# Patient Record
Sex: MALE | Race: WHITE | NOT HISPANIC OR LATINO | Employment: FULL TIME | URBAN - METROPOLITAN AREA
[De-identification: names, ages, dates, MRNs, and addresses within clinical notes are randomized per-mention and may not be internally consistent; named-entity substitution may affect disease eponyms.]

---

## 2024-06-25 ENCOUNTER — OFFICE VISIT (OUTPATIENT)
Dept: URGENT CARE | Facility: CLINIC | Age: 39
End: 2024-06-25
Payer: COMMERCIAL

## 2024-06-25 ENCOUNTER — APPOINTMENT (OUTPATIENT)
Dept: RADIOLOGY | Facility: CLINIC | Age: 39
End: 2024-06-25
Payer: COMMERCIAL

## 2024-06-25 VITALS
BODY MASS INDEX: 39.65 KG/M2 | TEMPERATURE: 98.5 F | WEIGHT: 252.6 LBS | HEART RATE: 62 BPM | DIASTOLIC BLOOD PRESSURE: 76 MMHG | SYSTOLIC BLOOD PRESSURE: 134 MMHG | OXYGEN SATURATION: 99 % | HEIGHT: 67 IN | RESPIRATION RATE: 16 BRPM

## 2024-06-25 DIAGNOSIS — M25.511 ACUTE PAIN OF RIGHT SHOULDER: ICD-10-CM

## 2024-06-25 DIAGNOSIS — M54.10 RADICULOPATHY AFFECTING UPPER EXTREMITY: Primary | ICD-10-CM

## 2024-06-25 PROCEDURE — 73030 X-RAY EXAM OF SHOULDER: CPT

## 2024-06-25 PROCEDURE — G0382 LEV 3 HOSP TYPE B ED VISIT: HCPCS

## 2024-06-25 PROCEDURE — S9083 URGENT CARE CENTER GLOBAL: HCPCS

## 2024-06-25 RX ORDER — PREDNISONE 20 MG/1
60 TABLET ORAL DAILY
Qty: 15 TABLET | Refills: 0 | Status: SHIPPED | OUTPATIENT
Start: 2024-06-25 | End: 2024-06-30

## 2024-06-25 RX ORDER — TIZANIDINE 4 MG/1
4 TABLET ORAL EVERY 6 HOURS PRN
COMMUNITY
Start: 2024-03-15

## 2024-06-25 RX ORDER — ESCITALOPRAM OXALATE 20 MG/1
20 TABLET ORAL DAILY
COMMUNITY
Start: 2024-05-09

## 2024-06-25 RX ORDER — CLOTRIMAZOLE AND BETAMETHASONE DIPROPIONATE 10; .64 MG/G; MG/G
CREAM TOPICAL 2 TIMES DAILY
COMMUNITY
Start: 2024-06-19

## 2024-06-25 RX ORDER — LIDOCAINE 50 MG/G
1 PATCH TOPICAL DAILY
Qty: 7 PATCH | Refills: 0 | Status: SHIPPED | OUTPATIENT
Start: 2024-06-25 | End: 2024-07-02

## 2024-06-25 RX ORDER — NAPROXEN 500 MG/1
1 TABLET ORAL 2 TIMES DAILY WITH MEALS
COMMUNITY
Start: 2024-06-19

## 2024-06-25 RX ORDER — GABAPENTIN 100 MG/1
200 CAPSULE ORAL 3 TIMES DAILY
COMMUNITY
Start: 2024-02-08

## 2024-06-25 RX ORDER — LANOLIN ALCOHOL/MO/W.PET/CERES
100 CREAM (GRAM) TOPICAL DAILY
COMMUNITY

## 2024-06-25 RX ORDER — AMLODIPINE BESYLATE 5 MG/1
1 TABLET ORAL DAILY
COMMUNITY
Start: 2024-04-10

## 2024-06-25 RX ORDER — VALSARTAN 160 MG/1
TABLET ORAL
COMMUNITY
Start: 2024-04-10

## 2024-06-25 RX ORDER — DOXYCYCLINE 100 MG/1
100 CAPSULE ORAL 2 TIMES DAILY
COMMUNITY
Start: 2024-06-19

## 2024-06-25 RX ORDER — CHLORDIAZEPOXIDE HYDROCHLORIDE 5 MG/1
5 CAPSULE, GELATIN COATED ORAL 3 TIMES DAILY PRN
COMMUNITY
Start: 2024-05-09

## 2024-06-25 NOTE — PATIENT INSTRUCTIONS
"Xray initial interpretation: no acute osseous abnormality   Official radiology read pending - We will only notify you if there needs to be a change in your treatment plan. Please download the my chart duane so you can view your results.   Take prednisone as prescribed  Use Lidoderm patches as prescribed.     Rest (for no longer than 24 hours)  Stretching exercises  Alternate ice and heat  Follow up with Physical Therapy or Ortho if problems still persists.   Follow up with PCP in 3-5 days.  Proceed to  ER if symptoms worsen.    If tests are performed, our office will contact you with results only if changes need to made to the care plan discussed with you at the visit. You can review your full results on [facility].      Patient Education     Radiculopathy of the neck and back (including sciatica)   The Basics   Written by the doctors and editors at [facility]   What is radiculopathy? -- \"Radiculopathy\" is the medical term for the pain, weakness, numbness, or tingling that happens when nerves coming from the spinal cord get pinched or damaged. Radiculopathy can affect different parts of the body, depending on which nerve or group of nerves is affected. People sometimes refer to radiculopathy as having a \"pinched nerve.\"  Here are 2 common examples of radiculopathy:   Cervical radiculopathy - People with this type of radiculopathy have pain, weakness, numbness, or tingling down 1 or both arms. The condition happens when 1 or more of the nerves that go from the spine to the arm get pinched or damaged.   Lumbosacral radiculopathy - People with this type of radiculopathy have pain, weakness, numbness, or tingling in the buttocks or down the leg. The condition happens when 1 or more of the nerves that go from the spine to the foot and leg get pinched or damaged. People often call this \"sciatica.\"  What causes radiculopathy? -- Radiculopathy is usually caused by a problem with the back. To understand radiculopathy, " "it's helpful to first learn a little about the back and spine.  The neck and back are made up of (figure 1):   Vertebrae - These are the bones of the spine. Each has a hole in the center. The vertebrae are stacked to form a hollow tube called the \"spinal canal.\" The spinal cord passes through this tube and is protected by the vertebrae.   Spinal cord and nerves - The spinal cord is the bundle of nerves that connects the brain to the rest of the body. It runs through the vertebrae. Nerves branch from the spinal cord and pass in between the vertebrae. From there, they connect to the arms, the legs, and the organs.   Discs - Rubbery discs sit in between each of the vertebrae. These add cushion and allow movement.   Muscles, tendons, and ligaments - These support the vertebrae. They are used to move the head and neck, stand upright, and bend and flex the body. They are also called the \"soft tissues\" of the neck and back.  Radiculopathy can happen when changes in the back cause a nerve to get pinched or damaged. This can happen if:   The vertebrae form bumps called bone spurs, which press on nearby nerves. People with a condition called \"spinal stenosis\" often have this problem (figure 2).   The discs between the vertebrae break open and bulge out, causing them to press on or irritate nearby nerves. (A disc that breaks open and bulges is called a \"herniated disc.\")   Other medical conditions, such as diabetes, infection, inflammation, or a tumor, injure the nerves near the spinal cord.  Should I see a doctor or nurse? -- If you have new pain, weakness, numbness, or tingling that seems to spread out to your arms or legs from your spine, see your doctor or nurse.  Will I need tests? -- Maybe. Doctors can tell a lot about a person's radiculopathy based on which parts of the body are affected and how. Because of that, you might not need any tests, especially if you have had symptoms only for a short time. Still, if your " "doctor is concerned about nerve damage, they might order 1 or more of these tests:   Imaging tests - Imaging tests, such as X-rays, MRIs, or CT scans, create pictures of the inside of the body. These imaging tests can show problems with the back, like those described above.   Electromyography (also called \"EMG\" or \"nerve conduction study\") - During this test, a technician or doctor checks how well electrical pulses travel across nerves to the part of your body that has symptoms. The test helps show whether the nerves controlling that body part are working right.  How is radiculopathy treated? -- Many people with radiculopathy do not need formal treatment. In some cases, the radiculopathy goes away as the back and nerves heal. In other cases, people find ways to cope with their symptoms.  When people do get treatment, the treatments can include:   Pain medicines that you can get without a prescription (if these do not work, your doctor might prescribe a stronger pain medicine)   Medicines to relax the muscles (called muscle relaxants)   Avoiding activities or positions that make the pain worse   Injections of medicines that numb the back or reduce swelling   Physical therapy to learn special exercises and stretches   Surgery to repair the problem causing symptoms  All topics are updated as new evidence becomes available and our peer review process is complete.  This topic retrieved from [employer] on: May 10, 2024.  Topic 78490 Version 12.0  Release: 32.4.3 - C32.129  © 2024 UpToDate, Inc. and/or its affiliates. All rights reserved.  figure 1: Anatomy of the back     This drawing shows the different parts of the back. Back pain can be caused by problems with the muscles, ligaments, discs, bones (vertebrae), or nerves.  Graphic 64726 Version 6.0  figure 2: Lumbar spinal stenosis     Spinal stenosis is when the spinal canal (the space around the spinal cord) becomes too narrow. When is affects the lower back, it is " "called \"lumbar\" spinal stenosis. It can be caused by different things, including bone spurs. If the spinal cord or nerves are pinched, this can lead to symptoms like pain, tingling, numbness, and weakness.  Graphic 227811 Version 3.0  Consumer Information Use and Disclaimer   Disclaimer: This generalized information is a limited summary of diagnosis, treatment, and/or medication information. It is not meant to be comprehensive and should be used as a tool to help the user understand and/or assess potential diagnostic and treatment options. It does NOT include all information about conditions, treatments, medications, side effects, or risks that may apply to a specific patient. It is not intended to be medical advice or a substitute for the medical advice, diagnosis, or treatment of a health care provider based on the health care provider's examination and assessment of a patient's specific and unique circumstances. Patients must speak with a health care provider for complete information about their health, medical questions, and treatment options, including any risks or benefits regarding use of medications. This information does not endorse any treatments or medications as safe, effective, or approved for treating a specific patient. UpToDate, Inc. and its affiliates disclaim any warranty or liability relating to this information or the use thereof.The use of this information is governed by the Terms of Use, available at https://www.[employer].Mediau[employer].com/en/know/clinical-effectiveness-terms. 2024© UpToDate, Inc. and its affiliates and/or licensors. All rights reserved.  Copyright   © 2024 UpToDate, Inc. and/or its affiliates. All rights reserved.    "

## 2024-06-25 NOTE — PROGRESS NOTES
[facility]        NAME: Ruby Frausto is a 38 y.o. male  : 1985    MRN: [id number]  DATE: 2024  TIME: 3:28 PM    Assessment and Plan   Radiculopathy affecting upper extremity [M54.10]  1. Radiculopathy affecting upper extremity  XR shoulder 2+ vw right    predniSONE 20 mg tablet    lidocaine (Lidoderm) 5 %    Ambulatory Referral to Physical Therapy    Ambulatory Referral to Orthopedic Surgery        Xray initial interpretation: no acute osseous abnormality   Official radiology read pending - We will only notify you if there needs to be a change in your treatment plan.  Will treat with prednisone, Lidoderm patches, and continued use of home medications of NSAID and muscle relaxer - supportive care reviewed  PT and ortho referral placed  Follow up with PCP    Patient Instructions     Xray initial interpretation: no acute osseous abnormality   Official radiology read pending - We will only notify you if there needs to be a change in your treatment plan. Please download the my chart [facility]ane so you can view your results.     Take prednisone as prescribed  Use Lidoderm patches as prescribed.     Rest (for no longer than 24 hours)  Stretching exercises  Alternate ice and heat  Follow up with Physical Therapy or Ortho if problems still persists.     Follow up with PCP in 3-5 days.  Proceed to  ER if symptoms worsen.    If tests are performed, our office will contact you with results only if changes need to made to the care plan discussed with you at the visit. [facility]    Chief Complaint     Chief Complaint   Patient presents with    Shoulder Pain     Right shoulder pain that radiates to elbow and causes tingling in fingers starting  morning. Worse in the am, eases throughout the day. Taking naproxen and muscle relaxer but not helping. Hx of dislocation 8-10 years ago, no recent injuries.          History of Present Illness       38-year-old male  arrives reporting right shoulder pain that radiates down right arm that is sharp and causes a tingling sensation in fingertips.  Patient reports he recently did workout his shoulders and back over the weekend and is unsure if this is related.  Patient reports a history of chronic back pain and is currently taking naproxen and a muscle relaxer for his back pain and thought that this would help his shoulder pain but it has not.  Patient is concerned there is something wrong in the bones, because he has a history of dislocation a few years ago and feels the pain is deep.  Will get x-ray of right shoulder and review.  Patient denies any new injury to right shoulder.  Patient has positive pulse motor sensation intact in affected extremity.  Patient has full range of motion in right shoulder, however has pain with movement.    Shoulder Pain   Pertinent negatives include no fever.       Review of Systems   Review of Systems   Constitutional:  Negative for chills and fever.   Respiratory:  Negative for cough and shortness of breath.    Cardiovascular:  Negative for chest pain.   Musculoskeletal:  Positive for myalgias (right shoulder pain that radiates down right arm and causes a sharp tingling sensation). Negative for back pain, neck pain and neck stiffness.   Skin:  Negative for color change.         Current Medications       Current Outpatient Medications:     amLODIPine (NORVASC) 5 mg tablet, Take 1 tablet by mouth daily, Disp: , Rfl:     chlordiazePOXIDE (LIBRIUM) 5 mg capsule, Take 5 mg by mouth Three times daily as needed, Disp: , Rfl:     clotrimazole-betamethasone (LOTRISONE) 1-0.05 % cream, Apply topically 2 (two) times a day, Disp: , Rfl:     doxycycline monohydrate (MONODOX) 100 mg capsule, Take 100 mg by mouth 2 (two) times a day, Disp: , Rfl:     escitalopram (LEXAPRO) 20 mg tablet, Take 20 mg by mouth daily, Disp: , Rfl:     gabapentin (NEURONTIN) 100 mg capsule, Take 200 mg by mouth Three times a day, Disp:  ", Rfl:     lidocaine (Lidoderm) 5 %, Apply 1 patch topically over 12 hours daily for 7 days Remove & Discard patch within 12 hours or as directed by MD, Disp: 7 patch, Rfl: 0    MILK THISTLE PO, Take by mouth, Disp: , Rfl:     Multiple Vitamin (MULTIVITAMIN PO), Take 1 capsule by mouth daily, Disp: , Rfl:     naproxen (NAPROSYN) 500 mg tablet, Take 1 tablet by mouth 2 (two) times a day with meals, Disp: , Rfl:     predniSONE 20 mg tablet, Take 3 tablets (60 mg total) by mouth daily for 5 days, Disp: 15 tablet, Rfl: 0    thiamine 100 MG tablet, Take 100 mg by mouth daily, Disp: , Rfl:     tiZANidine (ZANAFLEX) 4 mg tablet, Take 4 mg by mouth every 6 (six) hours as needed, Disp: , Rfl:     valsartan (DIOVAN) 160 mg tablet, , Disp: , Rfl:     Current Allergies     Allergies as of 06/25/2024 - Reviewed 06/25/2024   Allergen Reaction Noted    Pollen extract Other (See Comments) 03/26/2015            The following portions of the patient's history were reviewed and updated as appropriate: allergies, current medications, past family history, past medical history, past social history, past surgical history and problem list.     Past Medical History:   Diagnosis Date    Anxiety     High cholesterol     Hypertension        History reviewed. No pertinent surgical history.    Family History   Problem Relation Age of Onset    No Known Problems Mother     No Known Problems Father          Medications have been verified.        Objective   /76   Pulse 62   Temp 98.5 °F (36.9 °C)   Resp 16   Ht 5' 7\" (1.702 m)   Wt 115 kg (252 lb 9.6 oz)   SpO2 99%   BMI 39.56 kg/m²        Physical Exam     Physical Exam  Vitals and nursing note reviewed.   Constitutional:       General: He is not in acute distress.     Appearance: Normal appearance. He is not ill-appearing.   HENT:      Head: Normocephalic.      Right Ear: External ear normal.      Left Ear: External ear normal.      Nose: Nose normal. No congestion.      " Mouth/Throat:      Mouth: Mucous membranes are moist.      Pharynx: No oropharyngeal exudate or posterior oropharyngeal erythema.   Eyes:      Extraocular Movements: Extraocular movements intact.      Conjunctiva/sclera: Conjunctivae normal.      Pupils: Pupils are equal, round, and reactive to light.   Cardiovascular:      Rate and Rhythm: Normal rate and regular rhythm.      Pulses: Normal pulses.      Heart sounds: Normal heart sounds.   Pulmonary:      Effort: Pulmonary effort is normal. No respiratory distress.      Breath sounds: Normal breath sounds. No stridor. No wheezing, rhonchi or rales.   Chest:      Chest wall: No tenderness.   Musculoskeletal:         General: Tenderness present.      Right shoulder: Tenderness present. No swelling, deformity, laceration, bony tenderness or crepitus. Decreased range of motion. Normal strength. Normal pulse.      Cervical back: Normal range of motion and neck supple.   Lymphadenopathy:      Cervical: No cervical adenopathy.   Skin:     General: Skin is warm and dry.   Neurological:      General: No focal deficit present.      Mental Status: He is alert and oriented to person, place, and time.   Psychiatric:         Mood and Affect: Mood normal.         Behavior: Behavior normal.

## 2024-12-17 ENCOUNTER — OFFICE VISIT (OUTPATIENT)
Dept: URGENT CARE | Facility: CLINIC | Age: 39
End: 2024-12-17
Payer: COMMERCIAL

## 2024-12-17 VITALS
SYSTOLIC BLOOD PRESSURE: 178 MMHG | TEMPERATURE: 98.3 F | BODY MASS INDEX: 40.34 KG/M2 | WEIGHT: 257 LBS | DIASTOLIC BLOOD PRESSURE: 87 MMHG | HEIGHT: 67 IN | RESPIRATION RATE: 18 BRPM | OXYGEN SATURATION: 98 % | HEART RATE: 86 BPM

## 2024-12-17 DIAGNOSIS — T78.40XA ALLERGIC REACTION, INITIAL ENCOUNTER: Primary | ICD-10-CM

## 2024-12-17 PROBLEM — Z87.898 HISTORY OF ALCOHOL USE DISORDER: Status: ACTIVE | Noted: 2019-11-07

## 2024-12-17 PROBLEM — R74.01 ELEVATION OF LEVELS OF LIVER TRANSAMINASE LEVELS: Status: ACTIVE | Noted: 2019-11-07

## 2024-12-17 PROBLEM — E80.6 HYPERBILIRUBINEMIA: Status: ACTIVE | Noted: 2023-10-15

## 2024-12-17 PROBLEM — G47.09 OTHER INSOMNIA: Status: ACTIVE | Noted: 2023-10-20

## 2024-12-17 PROBLEM — E87.29 HIGH ANION GAP METABOLIC ACIDOSIS: Status: ACTIVE | Noted: 2023-10-15

## 2024-12-17 PROBLEM — M54.50 CHRONIC BILATERAL LOW BACK PAIN WITHOUT SCIATICA: Status: ACTIVE | Noted: 2023-06-14

## 2024-12-17 PROBLEM — E66.09 CLASS 2 OBESITY DUE TO EXCESS CALORIES WITH BODY MASS INDEX (BMI) OF 38.0 TO 38.9 IN ADULT: Status: ACTIVE | Noted: 2023-10-15

## 2024-12-17 PROBLEM — G89.29 CHRONIC BILATERAL LOW BACK PAIN WITHOUT SCIATICA: Status: ACTIVE | Noted: 2023-06-14

## 2024-12-17 PROBLEM — E55.9 VITAMIN D DEFICIENCY: Status: ACTIVE | Noted: 2019-08-01

## 2024-12-17 PROBLEM — E78.00 PURE HYPERCHOLESTEROLEMIA: Status: ACTIVE | Noted: 2022-11-30

## 2024-12-17 PROBLEM — E87.1 HYPONATREMIA: Status: ACTIVE | Noted: 2023-10-15

## 2024-12-17 PROBLEM — E66.812 CLASS 2 OBESITY DUE TO EXCESS CALORIES WITH BODY MASS INDEX (BMI) OF 38.0 TO 38.9 IN ADULT: Status: ACTIVE | Noted: 2023-10-15

## 2024-12-17 PROBLEM — R60.0 BILATERAL LOWER EXTREMITY EDEMA: Status: ACTIVE | Noted: 2020-10-28

## 2024-12-17 PROBLEM — E87.6 HYPOKALEMIA: Status: ACTIVE | Noted: 2023-10-15

## 2024-12-17 PROBLEM — E78.5 HYPERLIPIDEMIA: Status: ACTIVE | Noted: 2023-10-14

## 2024-12-17 PROBLEM — R00.0 SINUS TACHYCARDIA: Status: ACTIVE | Noted: 2023-10-15

## 2024-12-17 PROBLEM — E83.42 HYPOMAGNESEMIA: Status: ACTIVE | Noted: 2023-10-15

## 2024-12-17 PROBLEM — K21.9 GASTROESOPHAGEAL REFLUX DISEASE WITHOUT ESOPHAGITIS: Status: ACTIVE | Noted: 2019-08-01

## 2024-12-17 PROBLEM — F41.1 GENERALIZED ANXIETY DISORDER: Status: ACTIVE | Noted: 2020-08-18

## 2024-12-17 PROBLEM — I10 HYPERTENSION: Status: ACTIVE | Noted: 2023-10-14

## 2024-12-17 PROBLEM — F10.939 ALCOHOL WITHDRAWAL SYNDROME (HCC): Status: ACTIVE | Noted: 2023-10-14

## 2024-12-17 PROCEDURE — S9083 URGENT CARE CENTER GLOBAL: HCPCS

## 2024-12-17 PROCEDURE — G0382 LEV 3 HOSP TYPE B ED VISIT: HCPCS

## 2024-12-17 RX ORDER — PREDNISONE 10 MG/1
TABLET ORAL
Qty: 42 TABLET | Refills: 0 | Status: SHIPPED | OUTPATIENT
Start: 2024-12-17

## 2024-12-17 RX ORDER — TRIAMCINOLONE ACETONIDE 1 MG/G
CREAM TOPICAL 2 TIMES DAILY
Qty: 80 G | Refills: 1 | Status: SHIPPED | OUTPATIENT
Start: 2024-12-17

## 2024-12-17 NOTE — PROGRESS NOTES
[facility]        NAME: Ruby Frausto is a 39 y.o. male  : 1985    MRN: [id number]  DATE: 2024  TIME: 5:12 PM    Assessment and Plan   Allergic reaction, initial encounter [T78.40XA]  1. Allergic reaction, initial encounter  predniSONE 10 mg tablet    triamcinolone (KENALOG) 0.1 % cream            Patient Instructions     Take prednisone as prescribed - follow taper instructions - take in the morning with food  Use kenalog cream as prescribed.     Oatmeal baths  Avoid scratching area  Topical benadryl cream or calamine lotion during the day  Cool compresses  Allegra and Pepcid over the counter for daytime itchiness  Oral benadryl for itching as needed at night  Keep area clean and dry  Watch for signs of infection    Can discuss allergy testing with PCP to find out cause    Follow up with PCP in 3-5 days.  Proceed to  ER if symptoms worsen.    If tests are performed, our office will contact you with results only if changes need to made to the care plan discussed with you at the visit. [facility]    Chief Complaint     Chief Complaint   Patient presents with   • Urticaria     Unknown as to cause  Started on friday after visiting dentist  Has been getting worse since; itching          History of Present Illness       39-year-old male arrives reporting a rash that has spread over his trunk for the past 5 days.  Patient reports he noticed the rash after having x-rays completed at the dentist office.  Patient reports initially the rash was very small and only slightly itchy.  Patient reports over the past 2 to 3 days the rash has significantly spread onto bilateral arms, neck, face, chest and back and is severely itchy.  Patient denies any throat closing sensation, tightness in chest, shortness of breath, chest pain or abdominal pain at present.  Patient denies any fevers.  Patient denies any new medications, soaps, detergents or lotions.   Patient denies any new foods.  Patient denies any exposure to poison.    Urticaria      Review of Systems   Review of Systems   Constitutional: Negative.    HENT: Negative.     Respiratory: Negative.     Cardiovascular: Negative.    Gastrointestinal: Negative.    Musculoskeletal: Negative.    Skin:  Positive for rash.         Current Medications       Current Outpatient Medications:   •  amLODIPine (NORVASC) 5 mg tablet, Take 1 tablet by mouth daily, Disp: , Rfl:   •  chlordiazePOXIDE (LIBRIUM) 5 mg capsule, Take 5 mg by mouth Three times daily as needed, Disp: , Rfl:   •  escitalopram (LEXAPRO) 20 mg tablet, Take 20 mg by mouth daily, Disp: , Rfl:   •  gabapentin (NEURONTIN) 100 mg capsule, Take 200 mg by mouth Three times a day, Disp: , Rfl:   •  lidocaine (Lidoderm) 5 %, Apply 1 patch topically over 12 hours daily for 7 days Remove & Discard patch within 12 hours or as directed by MD, Disp: 7 patch, Rfl: 0  •  MILK THISTLE PO, Take by mouth, Disp: , Rfl:   •  Multiple Vitamin (MULTIVITAMIN PO), Take 1 capsule by mouth daily, Disp: , Rfl:   •  naproxen (NAPROSYN) 500 mg tablet, Take 1 tablet by mouth 2 (two) times a day with meals, Disp: , Rfl:   •  predniSONE 10 mg tablet, Take 60 mg on day one and two, Take 50 mg on day 3 - 4, Take 40 mg on day 5-6, Take 30 mg on day 7-8, Take 20 mg on day 8-9, Take 10 mg on day 10-11, Disp: 42 tablet, Rfl: 0  •  tiZANidine (ZANAFLEX) 4 mg tablet, Take 4 mg by mouth every 6 (six) hours as needed, Disp: , Rfl:   •  triamcinolone (KENALOG) 0.1 % cream, Apply topically 2 (two) times a day, Disp: 80 g, Rfl: 1  •  valsartan (DIOVAN) 160 mg tablet, , Disp: , Rfl:   •  clotrimazole-betamethasone (LOTRISONE) 1-0.05 % cream, Apply topically 2 (two) times a day (Patient not taking: Reported on 12/17/2024), Disp: , Rfl:   •  doxycycline monohydrate (MONODOX) 100 mg capsule, Take 100 mg by mouth 2 (two) times a day (Patient not taking: Reported on 12/17/2024), Disp: , Rfl:   •  thiamine 100  "MG tablet, Take 100 mg by mouth daily (Patient not taking: Reported on 12/17/2024), Disp: , Rfl:     Current Allergies     Allergies as of 12/17/2024 - Reviewed 12/17/2024   Allergen Reaction Noted   • Pollen extract Other (See Comments) 03/26/2015            The following portions of the patient's history were reviewed and updated as appropriate: allergies, current medications, past family history, past medical history, past social history, past surgical history and problem list.     Past Medical History:   Diagnosis Date   • Anxiety    • High cholesterol    • Hypertension        History reviewed. No pertinent surgical history.    Family History   Problem Relation Age of Onset   • No Known Problems Mother    • No Known Problems Father          Medications have been verified.        Objective   BP (!) 178/87   Pulse 86   Temp 98.3 °F (36.8 °C)   Resp 18   Ht 5' 7\" (1.702 m)   Wt 117 kg (257 lb)   SpO2 98%   BMI 40.25 kg/m²        Physical Exam     Physical Exam  Vitals and nursing note reviewed.   Constitutional:       Appearance: Normal appearance. He is not toxic-appearing or diaphoretic.   HENT:      Head: Normocephalic.      Right Ear: Tympanic membrane, ear canal and external ear normal.      Left Ear: Tympanic membrane, ear canal and external ear normal.      Nose: Nose normal.      Mouth/Throat:      Mouth: Mucous membranes are moist.      Pharynx: No posterior oropharyngeal erythema.   Eyes:      Extraocular Movements: Extraocular movements intact.      Pupils: Pupils are equal, round, and reactive to light.   Cardiovascular:      Rate and Rhythm: Normal rate.      Pulses: Normal pulses.      Heart sounds: Normal heart sounds.   Pulmonary:      Effort: Pulmonary effort is normal. No respiratory distress.      Breath sounds: Normal breath sounds. No stridor. No wheezing, rhonchi or rales.   Chest:      Chest wall: No tenderness.   Musculoskeletal:         General: Normal range of motion.      Cervical " back: Normal range of motion.   Lymphadenopathy:      Cervical: No cervical adenopathy.   Skin:     General: Skin is warm and dry.      Capillary Refill: Capillary refill takes less than 2 seconds.      Findings: Rash present. Rash is papular and urticarial.      Comments: diffuse   Neurological:      General: No focal deficit present.      Mental Status: He is alert and oriented to person, place, and time.   Psychiatric:         Mood and Affect: Mood normal.

## 2024-12-17 NOTE — PATIENT INSTRUCTIONS
Take prednisone as prescribed - follow taper instructions - take in the morning with food  Use kenalog cream as prescribed.     Oatmeal baths  Avoid scratching area  Topical benadryl cream or calamine lotion during the day  Cool compresses  Allegra and Pepcid over the counter for daytime itchiness  Oral benadryl for itching as needed at night  Keep area clean and dry  Watch for signs of infection    Can discuss allergy testing with PCP to find out cause    Follow up with PCP in 3-5 days.  Proceed to  ER if symptoms worsen.    If tests are performed, our office will contact you with results only if changes need to made to the care plan discussed with you at the visit. You can review your full results on [facility].

## 2025-01-21 ENCOUNTER — OFFICE VISIT (OUTPATIENT)
Dept: URGENT CARE | Facility: CLINIC | Age: 40
End: 2025-01-21
Payer: COMMERCIAL

## 2025-01-21 VITALS
HEIGHT: 66 IN | BODY MASS INDEX: 41.21 KG/M2 | TEMPERATURE: 97.5 F | RESPIRATION RATE: 18 BRPM | OXYGEN SATURATION: 99 % | WEIGHT: 256.4 LBS | HEART RATE: 102 BPM | SYSTOLIC BLOOD PRESSURE: 159 MMHG | DIASTOLIC BLOOD PRESSURE: 101 MMHG

## 2025-01-21 DIAGNOSIS — K08.89 TOOTHACHE: Primary | ICD-10-CM

## 2025-01-21 PROCEDURE — G0382 LEV 3 HOSP TYPE B ED VISIT: HCPCS | Performed by: PHYSICIAN ASSISTANT

## 2025-01-21 PROCEDURE — S9083 URGENT CARE CENTER GLOBAL: HCPCS | Performed by: PHYSICIAN ASSISTANT

## 2025-01-21 RX ORDER — PANTOPRAZOLE SODIUM 40 MG/1
40 TABLET, DELAYED RELEASE ORAL DAILY
COMMUNITY
Start: 2024-12-30

## 2025-01-21 RX ORDER — ATORVASTATIN CALCIUM 40 MG/1
40 TABLET, FILM COATED ORAL DAILY
COMMUNITY
Start: 2025-01-10

## 2025-01-22 NOTE — PROGRESS NOTES
"[facility]        NAME: Ruby Frausto is a 39 y.o. male  : 1985    MRN: [id number]  DATE: 2025  TIME: 7:22 PM    BP (!) 159/101   Pulse 102   Temp 97.5 °F (36.4 °C)   Resp 18   Ht 5' 6\" (1.676 m)   Wt 116 kg (256 lb 6.4 oz)   SpO2 99%   BMI 41.38 kg/m²     Assessment and Plan   Toothache [K08.89]  1. Toothache  amoxicillin-clavulanate (AUGMENTIN) 875-125 mg per tablet            Patient Instructions       Follow up with PCP in 3-5 days.  Proceed to  ER if symptoms worsen.    Chief Complaint     Chief Complaint   Patient presents with    Dental Pain     Left lower jaw pain from crack tooth going on for about a month. Dentist appointment next Thursday. Taking naproxen but having no relief.         History of Present Illness       Pt with left lower dental pain has seen dentist has appt for dental extraction     Dental Pain         Review of Systems   Review of Systems   Constitutional: Negative.    HENT: Negative.     Eyes: Negative.    Respiratory: Negative.     Cardiovascular: Negative.    Gastrointestinal: Negative.    Endocrine: Negative.    Genitourinary: Negative.    Musculoskeletal: Negative.    Skin: Negative.    Allergic/Immunologic: Negative.    Neurological: Negative.    Hematological: Negative.    Psychiatric/Behavioral: Negative.     All other systems reviewed and are negative.        Current Medications       Current Outpatient Medications:     amoxicillin-clavulanate (AUGMENTIN) 875-125 mg per tablet, Take 1 tablet by mouth every 12 (twelve) hours for 10 days, Disp: 20 tablet, Rfl: 0    atorvastatin (LIPITOR) 40 mg tablet, Take 40 mg by mouth daily, Disp: , Rfl:     Omega-3 Fatty Acids (FISH OIL PO), Take by mouth, Disp: , Rfl:     pantoprazole (PROTONIX) 40 mg tablet, Take 40 mg by mouth daily, Disp: , Rfl:     amLODIPine (NORVASC) 5 mg tablet, Take 1 tablet by mouth daily, Disp: , Rfl:     chlordiazePOXIDE (LIBRIUM) 5 mg capsule, Take 5 mg by mouth Three times " daily as needed, Disp: , Rfl:     clotrimazole-betamethasone (LOTRISONE) 1-0.05 % cream, Apply topically 2 (two) times a day (Patient not taking: Reported on 1/21/2025), Disp: , Rfl:     doxycycline monohydrate (MONODOX) 100 mg capsule, Take 100 mg by mouth 2 (two) times a day (Patient not taking: Reported on 1/21/2025), Disp: , Rfl:     escitalopram (LEXAPRO) 20 mg tablet, Take 20 mg by mouth daily, Disp: , Rfl:     gabapentin (NEURONTIN) 100 mg capsule, Take 200 mg by mouth Three times a day, Disp: , Rfl:     lidocaine (Lidoderm) 5 %, Apply 1 patch topically over 12 hours daily for 7 days Remove & Discard patch within 12 hours or as directed by MD, Disp: 7 patch, Rfl: 0    MILK THISTLE PO, Take by mouth, Disp: , Rfl:     Multiple Vitamin (MULTIVITAMIN PO), Take 1 capsule by mouth daily, Disp: , Rfl:     naproxen (NAPROSYN) 500 mg tablet, Take 1 tablet by mouth 2 (two) times a day with meals, Disp: , Rfl:     predniSONE 10 mg tablet, Take 60 mg on day one and two, Take 50 mg on day 3 - 4, Take 40 mg on day 5-6, Take 30 mg on day 7-8, Take 20 mg on day 8-9, Take 10 mg on day 10-11 (Patient not taking: Reported on 1/21/2025), Disp: 42 tablet, Rfl: 0    thiamine 100 MG tablet, Take 100 mg by mouth daily, Disp: , Rfl:     tiZANidine (ZANAFLEX) 4 mg tablet, Take 4 mg by mouth every 6 (six) hours as needed, Disp: , Rfl:     triamcinolone (KENALOG) 0.1 % cream, Apply topically 2 (two) times a day (Patient not taking: Reported on 1/21/2025), Disp: 80 g, Rfl: 1    valsartan (DIOVAN) 160 mg tablet, , Disp: , Rfl:     Current Allergies     Allergies as of 01/21/2025 - Reviewed 01/21/2025   Allergen Reaction Noted    Pollen extract Other (See Comments) 03/26/2015            The following portions of the patient's history were reviewed and updated as appropriate: allergies, current medications, past family history, past medical history, past social history, past surgical history and problem list.     Past Medical History:  "  Diagnosis Date    Anxiety     High cholesterol     Hypertension        History reviewed. No pertinent surgical history.    Family History   Problem Relation Age of Onset    No Known Problems Mother     No Known Problems Father          Medications have been verified.        Objective   BP (!) 159/101   Pulse 102   Temp 97.5 °F (36.4 °C)   Resp 18   Ht 5' 6\" (1.676 m)   Wt 116 kg (256 lb 6.4 oz)   SpO2 99%   BMI 41.38 kg/m²        Physical Exam     Physical Exam  Vitals and nursing note reviewed.   Constitutional:       Appearance: Normal appearance. He is normal weight.   HENT:      Head: Normocephalic and atraumatic.      Right Ear: Tympanic membrane, ear canal and external ear normal.      Left Ear: Tympanic membrane, ear canal and external ear normal.      Nose: Nose normal.      Mouth/Throat:      Mouth: Mucous membranes are moist.      Pharynx: Oropharynx is clear.      Comments: Left lower molar tenderness no swelling no erythema  + jaw pain no neck pain no ear pain   Eyes:      Extraocular Movements: Extraocular movements intact.      Conjunctiva/sclera: Conjunctivae normal.      Pupils: Pupils are equal, round, and reactive to light.   Cardiovascular:      Rate and Rhythm: Normal rate and regular rhythm.      Pulses: Normal pulses.      Heart sounds: Normal heart sounds.   Pulmonary:      Effort: Pulmonary effort is normal.      Breath sounds: Normal breath sounds.   Abdominal:      Palpations: Abdomen is soft.   Musculoskeletal:         General: Normal range of motion.      Cervical back: Normal range of motion and neck supple.   Skin:     General: Skin is warm.      Capillary Refill: Capillary refill takes less than 2 seconds.   Neurological:      Mental Status: He is alert and oriented to person, place, and time.                     "

## 2025-06-13 ENCOUNTER — OFFICE VISIT (OUTPATIENT)
Dept: URGENT CARE | Facility: CLINIC | Age: 40
End: 2025-06-13
Payer: COMMERCIAL

## 2025-06-13 VITALS
RESPIRATION RATE: 18 BRPM | SYSTOLIC BLOOD PRESSURE: 125 MMHG | TEMPERATURE: 98.5 F | OXYGEN SATURATION: 97 % | HEIGHT: 66 IN | WEIGHT: 239.6 LBS | BODY MASS INDEX: 38.51 KG/M2 | DIASTOLIC BLOOD PRESSURE: 73 MMHG | HEART RATE: 65 BPM

## 2025-06-13 DIAGNOSIS — L50.9 URTICARIA: Primary | ICD-10-CM

## 2025-06-13 PROCEDURE — S9083 URGENT CARE CENTER GLOBAL: HCPCS

## 2025-06-13 PROCEDURE — G0382 LEV 3 HOSP TYPE B ED VISIT: HCPCS

## 2025-06-13 RX ORDER — HYDROXYZINE PAMOATE 25 MG/1
CAPSULE ORAL
COMMUNITY
Start: 2025-03-12

## 2025-06-13 RX ORDER — LAMOTRIGINE 25 MG/1
TABLET ORAL
COMMUNITY
Start: 2025-06-04

## 2025-06-13 RX ORDER — PREDNISONE 10 MG/1
TABLET ORAL
Qty: 21 TABLET | Refills: 0 | Status: SHIPPED | OUTPATIENT
Start: 2025-06-13

## 2025-06-13 RX ORDER — TRAZODONE HYDROCHLORIDE 50 MG/1
TABLET ORAL
COMMUNITY
Start: 2025-03-12

## 2025-06-13 NOTE — PATIENT INSTRUCTIONS
Take course of steroids as prescribed. Be sure to take with food! Recommended to take in the morning, as taking this medication later in the day may cause sleep disturbance (keeping you awake). If diabetic, be sure to monitor your blood glucose levels while on this medication and notify PCP if levels become too elevated. Avoid taking ibuprofen containing products (ibuprofen, advil, aleve, motrin) while on steroid therapy!     Oral benadryl for itching/allergic response.     Avoid scratching area  Topical benadryl cream or calamine lotion during the day  Cool compresses  Allegra and Pepcid over the counter    Recommend to stop taking Lamictal if it is a possibility that the allergic response is coming from this medication.  Ensure close follow-up with PCP or what ever provider prescribes medication.    Should you develop difficulty breathing, chest pain, difficulty swallowing, itching/swelling of the mouth/throat/tongue, proceed to the ED immediately or call 911.    Follow up with PCP in 3-5 days.  Proceed to  ER if symptoms worsen.    If tests have been performed at [facility], our office will contact you with results if changes need to be made to the care plan discussed with you at the visit.  You can review your full results on [facility] MyChart.

## 2025-06-13 NOTE — PROGRESS NOTES
"  [facility]        NAME: Ruby Frausto is a 39 y.o. male  : 1985    MRN: [id number]  DATE: 2025  TIME: 6:56 PM    Assessment and Plan   Urticaria [L50.9]  1. Urticaria  predniSONE 10 mg tablet        Rash of unknown origin however does not appear to be bacterial or fungal in nature and does appear to be an allergic response given the presence of diffuse hives.  Discussed both injection of IM Solu-Medrol and Benadryl in the clinic for management of allergic response however patient declines at this time stating \"I do not trust injections since they came out with the COVID shot\".  Discussed mechanism of action of these medications in treating allergic responses however patient continues to adamantly decline.  No acute red flag findings on physical examination or reported by patient at this time.  Plan to treat with taper of oral steroid for management of allergic response/rash.  Patient continues to report concern, stating \"so do you think this is life-threatening?\".  Discussed with patient in detail that there are no acute red flag findings on physical examination however should his symptoms become worse and should he develop difficulty breathing, chest pain, difficulty swallowing, itching of the oral cavity, or other concerning symptoms to proceed to the ED immediately. Instructed patient to stop taking Lamictal if he is concerned that this is what is causing his rash and to contact prescribing provider. Patient states that the Lamictal \"has been helping\" him and he does not wish to stop taking this medication. Tylenol for pain/fever. Increased fluids & rest. May continue with other OTC and supportive therapies at home. Encouraged to follow up closely with family physician or healthcare provider. ED precautions provided/discussed. Patient in agreement with plan & verbalized understanding.       Patient Instructions   Take course of steroids as prescribed. Be sure to take with food! " Recommended to take in the morning, as taking this medication later in the day may cause sleep disturbance (keeping you awake). If diabetic, be sure to monitor your blood glucose levels while on this medication and notify PCP if levels become too elevated. Avoid taking ibuprofen containing products (ibuprofen, advil, aleve, motrin) while on steroid therapy!     Oral benadryl for itching/allergic response.     Avoid scratching area  Topical benadryl cream or calamine lotion during the day  Cool compresses  Allegra and Pepcid over the counter    Recommend to stop taking Lamictal if it is a possibility that the allergic response is coming from this medication.  Ensure close follow-up with PCP or what ever provider prescribes medication.    Should you develop difficulty breathing, chest pain, difficulty swallowing, itching/swelling of the mouth/throat/tongue, proceed to the ED immediately or call 911.    Follow up with PCP in 3-5 days.  Proceed to  ER if symptoms worsen.    If tests have been performed at [facility], our office will contact you with results if changes need to be made to the care plan discussed with you at the visit.  You can review your full results on [facility].    Chief Complaint     Chief Complaint   Patient presents with    Rash     Rash from new medication lamictal that started 1 hour ago          History of Present Illness       Patient is a 39-year-old male who presents today for evaluation of rash and concern for possible allergic reaction.  He reports that last Wednesday he began taking Lamictal and that this is the only new exposure that he has been in contact with.  He reports that he began suddenly approximately 2 hours ago with development of hives across his body and states that  he feels as though the the rash is becoming worse.  He has not taken anything for his symptoms prior to arrival today.  He reports that prior to arriving in the urgent care clinic he did contact the  "pharmacist who dispensed the medication and was instructed to proceed to the ED.  Patient then called the on-call provider for his PCP who instructed the patient to come to the urgent care.  Patient reports concern stating that he was informed that Lamictal can cause \"life-threatening rashes\".  Patient denies experiencing any difficulty breathing, chest pain, difficulty swallowing, nausea, vomiting, or oral swelling/itching.    Rash  This is a new problem. The current episode started today (x2 hour ago). The problem has been gradually worsening since onset. The rash is diffuse. The problem is moderate. The rash is characterized by itchiness and redness. He was exposed to a new medication. The rash first occurred at home. Associated symptoms include itching. Pertinent negatives include no congestion, cough, decreased physical activity, decreased responsiveness, decreased sleep, drinking less, diarrhea, facial edema, fatigue, fever, joint pain, rhinorrhea, shortness of breath, sore throat or vomiting. Past treatments include nothing. There were no sick contacts.       Review of Systems   Review of Systems   Constitutional:  Negative for activity change, appetite change, chills, decreased responsiveness, diaphoresis, fatigue and fever.   HENT:  Negative for congestion, ear discharge, ear pain, facial swelling, postnasal drip, rhinorrhea, sore throat, trouble swallowing and voice change.    Eyes:  Negative for photophobia, pain, redness, itching and visual disturbance.   Respiratory:  Negative for cough, chest tightness, shortness of breath, wheezing and stridor.    Cardiovascular:  Negative for chest pain and palpitations.   Gastrointestinal:  Negative for abdominal pain, diarrhea, nausea and vomiting.   Musculoskeletal:  Negative for arthralgias, back pain, gait problem, joint pain, joint swelling, myalgias, neck pain and neck stiffness.   Skin:  Positive for itching and rash. Negative for color change, pallor and " "wound.   Neurological:  Negative for dizziness, tremors, facial asymmetry, speech difficulty, weakness, light-headedness, numbness and headaches.   Hematological:  Negative for adenopathy.         Current Medications     Current Medications[1]    Current Allergies     Allergies as of 06/13/2025 - Reviewed 06/13/2025   Allergen Reaction Noted    Pollen extract Other (See Comments) 03/26/2015            The following portions of the patient's history were reviewed and updated as appropriate: allergies, current medications, past family history, past medical history, past social history, past surgical history and problem list.     Past Medical History[2]    Past Surgical History[3]    Family History[4]      Medications have been verified.        Objective   /73   Pulse 65   Temp 98.5 °F (36.9 °C) (Tympanic)   Resp 18   Ht 5' 6\" (1.676 m)   Wt 109 kg (239 lb 9.6 oz)   SpO2 97%   BMI 38.67 kg/m²   No LMP for male patient.       Physical Exam     Physical Exam  Vitals and nursing note reviewed.   Constitutional:       General: He is awake. He is not in acute distress.     Appearance: Normal appearance. He is ill-appearing. He is not toxic-appearing or diaphoretic.   HENT:      Head: Normocephalic and atraumatic.      Right Ear: External ear normal.      Left Ear: External ear normal.      Nose: Nose normal. No congestion or rhinorrhea.      Mouth/Throat:      Lips: Pink. No lesions.      Mouth: Mucous membranes are moist. No oral lesions or angioedema.      Tongue: No lesions. Tongue does not deviate from midline.      Pharynx: Oropharynx is clear. No pharyngeal swelling, oropharyngeal exudate, posterior oropharyngeal erythema or uvula swelling.     Eyes:      Extraocular Movements: Extraocular movements intact.      Conjunctiva/sclera: Conjunctivae normal.      Pupils: Pupils are equal, round, and reactive to light.       Cardiovascular:      Rate and Rhythm: Normal rate and regular rhythm.      Pulses: " Normal pulses.      Heart sounds: Normal heart sounds.   Pulmonary:      Effort: Pulmonary effort is normal. No respiratory distress.      Breath sounds: Normal breath sounds. No stridor. No wheezing, rhonchi or rales.   Chest:      Chest wall: No tenderness.   Abdominal:      Palpations: Abdomen is soft.     Musculoskeletal:         General: No swelling, tenderness, deformity or signs of injury. Normal range of motion.      Cervical back: Normal range of motion and neck supple. No tenderness.   Lymphadenopathy:      Cervical: No cervical adenopathy.     Skin:     General: Skin is warm.      Capillary Refill: Capillary refill takes less than 2 seconds.      Coloration: Skin is not jaundiced or pale.      Findings: Rash present. No bruising, erythema or lesion. Rash is urticarial.      Comments: Diffuse hives scattered across body. No bleeding/drainage noted. Hives nonTTP.      Neurological:      General: No focal deficit present.      Mental Status: He is alert.      Sensory: No sensory deficit.      Motor: No weakness.      Gait: Gait normal.     Psychiatric:         Mood and Affect: Mood normal.         Behavior: Behavior normal. Behavior is cooperative.         Thought Content: Thought content normal.         Judgment: Judgment normal.                        [1]   Current Outpatient Medications:     amLODIPine (NORVASC) 5 mg tablet, Take 1 tablet by mouth in the morning., Disp: , Rfl:     chlordiazePOXIDE (LIBRIUM) 5 mg capsule, Take 5 mg by mouth as needed in the morning and 5 mg as needed at noon and 5 mg as needed in the evening., Disp: , Rfl:     escitalopram (LEXAPRO) 20 mg tablet, Take 20 mg by mouth in the morning., Disp: , Rfl:     hydrOXYzine pamoate (VISTARIL) 25 mg capsule, , Disp: , Rfl:     lamoTRIgine (LaMICtal) 25 mg tablet, , Disp: , Rfl:     lidocaine (Lidoderm) 5 %, Apply 1 patch topically over 12 hours daily for 7 days Remove & Discard patch within 12 hours or as directed by MD, Disp: 7 patch,  Rfl: 0    MILK THISTLE PO, Take by mouth, Disp: , Rfl:     Multiple Vitamin (MULTIVITAMIN PO), Take 1 capsule by mouth in the morning., Disp: , Rfl:     naproxen (NAPROSYN) 500 mg tablet, Take 1 tablet by mouth in the morning and 1 tablet in the evening. Take with meals., Disp: , Rfl:     Omega-3 Fatty Acids (FISH OIL PO), Take by mouth, Disp: , Rfl:     pantoprazole (PROTONIX) 40 mg tablet, Take 40 mg by mouth in the morning., Disp: , Rfl:     predniSONE 10 mg tablet, Take six pills on day 1, take five pills on day 2, take four pills on day 3, take three pills on day 4, take two pills on day 5, take one pill on day 6., Disp: 21 tablet, Rfl: 0    thiamine 100 MG tablet, Take 100 mg by mouth in the morning., Disp: , Rfl:     tiZANidine (ZANAFLEX) 4 mg tablet, Take 4 mg by mouth every 6 (six) hours as needed, Disp: , Rfl:     traZODone (DESYREL) 50 mg tablet, , Disp: , Rfl:     valsartan (DIOVAN) 160 mg tablet, , Disp: , Rfl:     atorvastatin (LIPITOR) 40 mg tablet, Take 40 mg by mouth daily (Patient not taking: Reported on 6/13/2025), Disp: , Rfl:     clotrimazole-betamethasone (LOTRISONE) 1-0.05 % cream, Apply topically 2 (two) times a day (Patient not taking: Reported on 12/17/2024), Disp: , Rfl:     doxycycline monohydrate (MONODOX) 100 mg capsule, Take 100 mg by mouth 2 (two) times a day (Patient not taking: Reported on 12/17/2024), Disp: , Rfl:     gabapentin (NEURONTIN) 100 mg capsule, Take 200 mg by mouth Three times a day (Patient not taking: Reported on 6/13/2025), Disp: , Rfl:     predniSONE 10 mg tablet, Take 60 mg on day one and two, Take 50 mg on day 3 - 4, Take 40 mg on day 5-6, Take 30 mg on day 7-8, Take 20 mg on day 8-9, Take 10 mg on day 10-11 (Patient not taking: Reported on 6/13/2025), Disp: 42 tablet, Rfl: 0    triamcinolone (KENALOG) 0.1 % cream, Apply topically 2 (two) times a day (Patient not taking: Reported on 6/13/2025), Disp: 80 g, Rfl: 1  [2]   Past Medical History:  Diagnosis Date     Anxiety     High cholesterol     Hypertension    [3] No past surgical history on file.  [4]   Family History  Problem Relation Name Age of Onset    No Known Problems Mother      No Known Problems Father